# Patient Record
Sex: FEMALE | Race: WHITE | Employment: FULL TIME | URBAN - METROPOLITAN AREA
[De-identification: names, ages, dates, MRNs, and addresses within clinical notes are randomized per-mention and may not be internally consistent; named-entity substitution may affect disease eponyms.]

---

## 2018-05-08 ENCOUNTER — HOSPITAL ENCOUNTER (EMERGENCY)
Age: 28
Discharge: HOME | End: 2018-05-08
Attending: EMERGENCY MEDICINE | Admitting: EMERGENCY MEDICINE
Payer: SELF-PAY

## 2018-05-08 ENCOUNTER — APPOINTMENT (OUTPATIENT)
Dept: ULTRASOUND IMAGING | Age: 28
End: 2018-05-08
Attending: EMERGENCY MEDICINE
Payer: SELF-PAY

## 2018-05-08 VITALS
OXYGEN SATURATION: 98 % | HEART RATE: 82 BPM | TEMPERATURE: 98.8 F | DIASTOLIC BLOOD PRESSURE: 59 MMHG | BODY MASS INDEX: 20.88 KG/M2 | SYSTOLIC BLOOD PRESSURE: 94 MMHG | WEIGHT: 133 LBS | HEIGHT: 67 IN | RESPIRATION RATE: 16 BRPM

## 2018-05-08 DIAGNOSIS — O03.2 INCOMPLETE MISCARRIAGE WITH BLOOD CLOT: Primary | ICD-10-CM

## 2018-05-08 LAB
ABO + RH BLD: NORMAL
ALBUMIN SERPL-MCNC: 3.6 G/DL (ref 3.4–5)
ALBUMIN/GLOB SERPL: 1.2 {RATIO} (ref 0.8–1.7)
ALP SERPL-CCNC: 49 U/L (ref 45–117)
ALT SERPL-CCNC: 11 U/L (ref 13–56)
ANION GAP SERPL CALC-SCNC: 8 MMOL/L (ref 3–18)
AST SERPL-CCNC: 7 U/L (ref 15–37)
BASOPHILS # BLD: 0 K/UL (ref 0–0.06)
BASOPHILS NFR BLD: 0 % (ref 0–2)
BILIRUB SERPL-MCNC: 0.2 MG/DL (ref 0.2–1)
BUN SERPL-MCNC: 6 MG/DL (ref 7–18)
BUN/CREAT SERPL: 7 (ref 12–20)
CALCIUM SERPL-MCNC: 8.2 MG/DL (ref 8.5–10.1)
CHLORIDE SERPL-SCNC: 108 MMOL/L (ref 100–108)
CO2 SERPL-SCNC: 26 MMOL/L (ref 21–32)
CREAT SERPL-MCNC: 0.84 MG/DL (ref 0.6–1.3)
DIFFERENTIAL METHOD BLD: ABNORMAL
EOSINOPHIL # BLD: 0.2 K/UL (ref 0–0.4)
EOSINOPHIL NFR BLD: 2 % (ref 0–5)
ERYTHROCYTE [DISTWIDTH] IN BLOOD BY AUTOMATED COUNT: 12.7 % (ref 11.6–14.5)
GLOBULIN SER CALC-MCNC: 2.9 G/DL (ref 2–4)
GLUCOSE SERPL-MCNC: 118 MG/DL (ref 74–99)
HCG SERPL-ACNC: 913 MIU/ML (ref 0–10)
HCT VFR BLD AUTO: 37.3 % (ref 35–45)
HGB BLD-MCNC: 13.1 G/DL (ref 12–16)
LYMPHOCYTES # BLD: 2.4 K/UL (ref 0.9–3.6)
LYMPHOCYTES NFR BLD: 23 % (ref 21–52)
MCH RBC QN AUTO: 32 PG (ref 24–34)
MCHC RBC AUTO-ENTMCNC: 35.1 G/DL (ref 31–37)
MCV RBC AUTO: 91.2 FL (ref 74–97)
MONOCYTES # BLD: 0.5 K/UL (ref 0.05–1.2)
MONOCYTES NFR BLD: 5 % (ref 3–10)
NEUTS SEG # BLD: 7.2 K/UL (ref 1.8–8)
NEUTS SEG NFR BLD: 70 % (ref 40–73)
PLATELET # BLD AUTO: 207 K/UL (ref 135–420)
PMV BLD AUTO: 10.4 FL (ref 9.2–11.8)
POTASSIUM SERPL-SCNC: 3.9 MMOL/L (ref 3.5–5.5)
PROT SERPL-MCNC: 6.5 G/DL (ref 6.4–8.2)
RBC # BLD AUTO: 4.09 M/UL (ref 4.2–5.3)
SODIUM SERPL-SCNC: 142 MMOL/L (ref 136–145)
WBC # BLD AUTO: 10.4 K/UL (ref 4.6–13.2)

## 2018-05-08 PROCEDURE — 84702 CHORIONIC GONADOTROPIN TEST: CPT | Performed by: EMERGENCY MEDICINE

## 2018-05-08 PROCEDURE — 76817 TRANSVAGINAL US OBSTETRIC: CPT

## 2018-05-08 PROCEDURE — 96375 TX/PRO/DX INJ NEW DRUG ADDON: CPT

## 2018-05-08 PROCEDURE — 86900 BLOOD TYPING SEROLOGIC ABO: CPT | Performed by: EMERGENCY MEDICINE

## 2018-05-08 PROCEDURE — 85025 COMPLETE CBC W/AUTO DIFF WBC: CPT | Performed by: EMERGENCY MEDICINE

## 2018-05-08 PROCEDURE — 99284 EMERGENCY DEPT VISIT MOD MDM: CPT

## 2018-05-08 PROCEDURE — 96374 THER/PROPH/DIAG INJ IV PUSH: CPT

## 2018-05-08 PROCEDURE — 74011250636 HC RX REV CODE- 250/636: Performed by: EMERGENCY MEDICINE

## 2018-05-08 PROCEDURE — 96361 HYDRATE IV INFUSION ADD-ON: CPT

## 2018-05-08 PROCEDURE — 88304 TISSUE EXAM BY PATHOLOGIST: CPT | Performed by: EMERGENCY MEDICINE

## 2018-05-08 PROCEDURE — 80053 COMPREHEN METABOLIC PANEL: CPT | Performed by: EMERGENCY MEDICINE

## 2018-05-08 PROCEDURE — 88305 TISSUE EXAM BY PATHOLOGIST: CPT | Performed by: EMERGENCY MEDICINE

## 2018-05-08 RX ORDER — ONDANSETRON 2 MG/ML
4 INJECTION INTRAMUSCULAR; INTRAVENOUS
Status: COMPLETED | OUTPATIENT
Start: 2018-05-08 | End: 2018-05-08

## 2018-05-08 RX ORDER — MORPHINE SULFATE 10 MG/ML
4 INJECTION, SOLUTION INTRAMUSCULAR; INTRAVENOUS
Status: COMPLETED | OUTPATIENT
Start: 2018-05-08 | End: 2018-05-08

## 2018-05-08 RX ADMIN — ONDANSETRON 4 MG: 2 INJECTION INTRAMUSCULAR; INTRAVENOUS at 05:09

## 2018-05-08 RX ADMIN — SODIUM CHLORIDE 1000 ML: 900 INJECTION, SOLUTION INTRAVENOUS at 03:57

## 2018-05-08 RX ADMIN — MORPHINE SULFATE 4 MG: 10 INJECTION INTRAMUSCULAR; INTRAVENOUS; SUBCUTANEOUS at 05:10

## 2018-05-08 NOTE — ED NOTES
Pt ambulated to restroom and notified RN pt passed 'something' vaginally. MD Chrystal Pichardo assessed specimen at the bedside. Specimen sent to the lab for pathology.

## 2018-05-08 NOTE — ED NOTES
Pt upset over baby crying in another room. On return from ultrasound , moved to waiting room for discharge instructions. I have reviewed discharge instructions with the patient. The patient verbalized understanding. Patient armband removed and given to patient to take home. Patient was informed of the privacy risks if armband lost or stolen    The patient Eduardo Bellamy is a 33 y.o. female who  has no past medical history on file. .  She   The patient's medications were reviewed and discussed prior to discharge. The patient was very interactive and did understand their medications. The following medications were discussed in details with the patient. The patient said [facility]. [email or web address]    Nathaniel Hutton RN    [employer]    Thank you for requesting access to [employer]. Please follow the instructions below to securely access and download your online medical record. [employer] allows you to send messages to your doctor, view your test results, renew your prescriptions, schedule appointments, and more. How Do I Sign Up? 1. In your internet browser, go to [email or web address]  2. Click on the First Time User? Click Here link in the Sign In box. You will be redirect to the New Member Sign Up page. 3. Enter your [employer] Access Code exactly as it appears below. You will not need to use this code after youve completed the sign-up process. If you do not sign up before the expiration date, you must request a new code. [employer] Access Code: [unfilled] (This is the date your [employer] access code will )    4. Enter the last four digits of your Social Security Number (xxxx) and Date of Birth (mm/dd/yyyy) as indicated and click Submit. You will be taken to the next sign-up page. 5. Create a [employer] ID. This will be your [employer] login ID and cannot be changed, so think of one that is secure and easy to remember. 6. Create a [employer] password.  You can change your password at any time. 7. Enter your Password Reset Question and Answer. This can be used at a later time if you forget your password. 8. Enter your e-mail address. You will receive e-mail notification when new information is available in [address] 9. Click Sign Up. You can now view and download portions of your medical record. 10. Click the Download Summary menu link to download a portable copy of your medical information. Additional Information    If you have questions, please visit the Frequently Asked Questions section of the [employer] website at [email or web address] Remember, [employer] is NOT to be used for urgent needs. For medical emergencies, dial 911.

## 2018-05-08 NOTE — ED PROVIDER NOTES
HPI Comments: Fransisco Garcia is a 33 y.o. Female, , lmp b 26 with c/o increased vaginal bleeding since yesterday. No prenatal care. Pos home preg test 3 weeks. Ago. Bleeding increased tonight which woke her up. Passing some clots. No poc seen. Cramping is intermittent. No nvd, fcs. No h/o rhogam with previous pregnancies; bleeding worse with standing, and is spontaneous. Nothing taken prior to arrival      The history is provided by the patient. History reviewed. No pertinent past medical history. Past Surgical History:   Procedure Laterality Date    HX APPENDECTOMY           History reviewed. No pertinent family history. Social History     Social History    Marital status:      Spouse name: N/A    Number of children: N/A    Years of education: N/A     Occupational History    Not on file. Social History Main Topics    Smoking status: Former Smoker     Quit date: 3/8/2018    Smokeless tobacco: Never Used    Alcohol use No    Drug use: No    Sexual activity: Not on file     Other Topics Concern    Not on file     Social History Narrative    No narrative on file         ALLERGIES: Amoxicillin    Review of Systems   Constitutional: Negative for fever. HENT: Negative for sore throat. Eyes: Negative for visual disturbance. Respiratory: Negative for cough and shortness of breath. Cardiovascular: Negative for chest pain. Gastrointestinal: Positive for abdominal pain. Endocrine: Negative for polyuria. Genitourinary: Positive for vaginal bleeding. Negative for difficulty urinating. Musculoskeletal: Negative for gait problem. Skin: Negative for rash. Allergic/Immunologic: Negative for immunocompromised state. Neurological: Positive for light-headedness. Hematological: Does not bruise/bleed easily. Psychiatric/Behavioral: Positive for sleep disturbance.        Vitals:    18 0415 18 0430 18 0445 18 0500   BP: 111/61 105/56 110/67 108/59 Pulse:    82   Resp:       Temp:       SpO2: 100% 100% 100% 100%   Weight:       Height:                Physical Exam   Constitutional: She is oriented to person, place, and time. She appears well-developed and well-nourished. Non-toxic appearance. She does not have a sickly appearance. She does not appear ill. She appears distressed. HENT:   Head: Normocephalic and atraumatic. Right Ear: External ear normal.   Left Ear: External ear normal.   Nose: Nose normal.   Mouth/Throat: Uvula is midline, oropharynx is clear and moist and mucous membranes are normal.   Eyes: Conjunctivae are normal. No scleral icterus. Neck: Neck supple. Cardiovascular: Normal rate, regular rhythm, normal heart sounds and intact distal pulses. Pulmonary/Chest: Effort normal and breath sounds normal.   Abdominal: Soft. There is no hepatosplenomegaly. There is tenderness. There is no rigidity, no rebound, no guarding and no CVA tenderness. Genitourinary:   Genitourinary Comments: Large amount of blood in vaginal vault. Os open with POC extruding   Musculoskeletal: She exhibits no edema. Neurological: She is alert and oriented to person, place, and time. Gait normal.   Skin: Skin is warm and dry. She is not diaphoretic. Psychiatric: Her behavior is normal.   Nursing note and vitals reviewed.        [facility]      ED Course       Procedures    Vitals:  Patient Vitals for the past 12 hrs:   Temp Pulse Resp BP SpO2   05/08/18 0500 - 82 - 108/59 100 %   05/08/18 0445 - - - 110/67 100 %   05/08/18 0430 - - - 105/56 100 %   05/08/18 0415 - - - 111/61 100 %   05/08/18 0345 - - - 115/67 99 %   05/08/18 0343 98.8 °F (37.1 °C) (!) 108 16 118/85 100 %         Medications ordered:   Medications   sodium chloride 0.9 % bolus infusion 1,000 mL (1,000 mL IntraVENous New Bag 5/8/18 2797)   morphine injection 4 mg (4 mg IntraVENous Given 5/8/18 9910)   ondansetron (ZOFRAN) injection 4 mg (4 mg IntraVENous Given 5/8/18 6051)         Lab findings:  Recent Results (from the past 12 hour(s))   CBC WITH AUTOMATED DIFF    Collection Time: 05/08/18  3:52 AM   Result Value Ref Range    WBC 10.4 4.6 - 13.2 K/uL    RBC 4.09 (L) 4.20 - 5.30 M/uL    HGB 13.1 12.0 - 16.0 g/dL    HCT 37.3 35.0 - 45.0 %    MCV 91.2 74.0 - 97.0 FL    MCH 32.0 24.0 - 34.0 PG    MCHC 35.1 31.0 - 37.0 g/dL    RDW 12.7 11.6 - 14.5 %    PLATELET 713 279 - 097 K/uL    MPV 10.4 9.2 - 11.8 FL    NEUTROPHILS 70 40 - 73 %    LYMPHOCYTES 23 21 - 52 %    MONOCYTES 5 3 - 10 %    EOSINOPHILS 2 0 - 5 %    BASOPHILS 0 0 - 2 %    ABS. NEUTROPHILS 7.2 1.8 - 8.0 K/UL    ABS. LYMPHOCYTES 2.4 0.9 - 3.6 K/UL    ABS. MONOCYTES 0.5 0.05 - 1.2 K/UL    ABS. EOSINOPHILS 0.2 0.0 - 0.4 K/UL    ABS. BASOPHILS 0.0 0.0 - 0.06 K/UL    DF AUTOMATED     BLOOD TYPE, (ABO+RH)    Collection Time: 05/08/18  3:52 AM   Result Value Ref Range    ABO/Rh(D) B POSITIVE    METABOLIC PANEL, COMPREHENSIVE    Collection Time: 05/08/18  3:52 AM   Result Value Ref Range    Sodium 142 136 - 145 mmol/L    Potassium 3.9 3.5 - 5.5 mmol/L    Chloride 108 100 - 108 mmol/L    CO2 26 21 - 32 mmol/L    Anion gap 8 3.0 - 18 mmol/L    Glucose 118 (H) 74 - 99 mg/dL    BUN 6 (L) 7.0 - 18 MG/DL    Creatinine 0.84 0.6 - 1.3 MG/DL    BUN/Creatinine ratio 7 (L) 12 - 20      GFR est AA >60 >60 ml/min/1.73m2    GFR est non-AA >60 >60 ml/min/1.73m2    Calcium 8.2 (L) 8.5 - 10.1 MG/DL    Bilirubin, total 0.2 0.2 - 1.0 MG/DL    ALT (SGPT) 11 (L) 13 - 56 U/L    AST (SGOT) 7 (L) 15 - 37 U/L    Alk.  phosphatase 49 45 - 117 U/L    Protein, total 6.5 6.4 - 8.2 g/dL    Albumin 3.6 3.4 - 5.0 g/dL    Globulin 2.9 2.0 - 4.0 g/dL    A-G Ratio 1.2 0.8 - 1.7     BETA HCG, QT    Collection Time: 05/08/18  3:52 AM   Result Value Ref Range    Beta HCG,  (H) 0 - 10 MIU/ML       EKG interpretation by ED Physician:      X-Ray, CT or other radiology findings or impressions:  US UTS TRANSVAGINAL OB    (Results Pending)       Progress notes, Consult notes or additional Procedure notes:   Miscarriage in progress. Will keep pt here for formal us in am.   D/w pt current plan  Will t/o to dr Anna Hutton to f/u on US results, possible ob consult    Pt did pass specimen that appears to be c/w poc which was sent for path evaluation    Reevaluation of patient:   stable    Disposition:  Diagnosis:   1.  Incomplete miscarriage with blood clot        Disposition: pending completion work up    Follow-up Information     Follow up With Details Comments [address], [place] Schedule an appointment as soon as possible for a visit  Carolee Mello [address] [facility] EMERGENCY DEPT  If symptoms worsen [address] [place]  [phone]            Patient's Medications    No medications on file

## 2018-05-08 NOTE — DISCHARGE INSTRUCTIONS
Miscarriage: Care Instructions  Your Care Instructions    The loss of a pregnancy can be very hard. You may wonder why it happened or blame yourself. Miscarriages are common and are not caused by exercise, stress, or sex. Most happen because the fertilized egg in the uterus does not develop normally. There is no treatment that can stop a miscarriage. As long as you do not have heavy blood loss, fever, weakness, or other signs of infection, you can let a miscarriage follow its own course. This can take several days. Your body will recover over the next several weeks. Having a miscarriage does not mean you cannot have a normal pregnancy in the future. The doctor has checked you carefully, but problems can develop later. If you notice any problems or new symptoms, get medical treatment right away. Follow-up care is a key part of your treatment and safety. Be sure to make and go to all appointments, and call your doctor if you are having problems. It's also a good idea to know your test results and keep a list of the medicines you take. How can you care for yourself at home? · You will probably have some vaginal bleeding for 1 to 2 weeks. It may be similar to or slightly heavier than a normal period. The bleeding should get lighter after a week. Use pads instead of tampons. You may use tampons during your next period, which should start in 3 to 6 weeks. · Take an over-the-counter pain medicine, such as acetaminophen (Tylenol), ibuprofen (Advil, Motrin), or naproxen (Aleve) for cramps. Read and follow all instructions on the label. You may have cramps for several days after the miscarriage. · Do not take two or more pain medicines at the same time unless the doctor told you to. Many pain medicines have acetaminophen, which is Tylenol. Too much acetaminophen (Tylenol) can be harmful. · Use a clear container to save any tissue that you pass. Take it to your doctor's office as soon as you can.   · Do not have sex until the bleeding stops. · You may return to your normal activities if you feel well enough to do so. But you should avoid heavy exercise until the bleeding stops. · If you plan to get pregnant again, check with your doctor. Most doctors suggest waiting until you have had at least one normal period before you try to get pregnant. · If you do not want to get pregnant, ask your doctor about birth control. You can get pregnant again before your next period starts if you are not using birth control. · You may be low in iron because of blood loss. Eat a balanced diet that is high in iron and vitamin C. Foods rich in iron include red meat, shellfish, eggs, beans, and leafy green vegetables. Foods high in vitamin C include citrus fruits, tomatoes, and broccoli. Talk to your doctor about whether you need to take iron pills or a multivitamin. · The loss of a pregnancy can be very hard. You may wonder why it happened and blame yourself. Talking to family members, friends, a counselor, or your doctor may help you cope with your loss. When should you call for help? Call 911 anytime you think you may need emergency care. For example, call if:  ? · You passed out (lost consciousness). ?Call your doctor now or seek immediate medical care if:  ? · You have severe vaginal bleeding. ? · You are dizzy or lightheaded, or you feel like you may faint. ? · You have new or worse pain in your belly or pelvis. ? · You have a fever. ? · You have vaginal discharge that smells bad. ? Watch closely for changes in your health, and be sure to contact your doctor if:  ? · You do not get better as expected. Where can you learn more? Go to http://lilliam-demar.info/. Enter E802 in the search box to learn more about \"Miscarriage: Care Instructions. \"  Current as of: March 16, 2017  Content Version: 11.4  © 8580-1376 Healthwise, [employer].  Care instructions adapted under license by [employer] (which disclaims liability or warranty for this information). If you have questions about a medical condition or this instruction, always ask your healthcare professional. [address] any warranty or liability for your use of this information.

## 2018-05-08 NOTE — ED NOTES
Verbal shift change report given to Kate Powers RN (oncoming nurse) by Mike Lima RN (offgoing nurse). Report included the following information SBAR.

## 2018-05-08 NOTE — ED NOTES
Introduced self to patient . Patient aware of call bell. Given expectations of treatment ordered. Pt awaiting ultrasound.  Remains on monitor with BP cycling and pulse ox

## 2018-05-08 NOTE — ED TRIAGE NOTES
Pt arrived to the ED through triage. Pt stated pt is about 11 weeks pregnant and noticed brown color vaginal discharge and bleeding. Pt soaked two pads pta. Pt c/o lower abdominal and back pain.

## 2018-05-08 NOTE — ED NOTES
6:00 AM :Pt care assumed from Dr. Bernett Cranker, ED provider. Pt complaint(s), current treatment plan, progression and available diagnostic results have been discussed thoroughly. Rounding occurred: yes  Intended Disposition: Home, if US negative. Pending diagnostic reports and/or labs (please list): US Transvaginal.    8:07 AM: Evaluated patient. States she has no symptoms currently. She knows if there is something abnormal found in the US, she will have to come back to the ED. Patient states she is able to follow up with OB as she is a birthing . Patient verbalizes she will wait for my results, patient is currently asymptomatic.    12:09 PM: Patient called and discussed her US. No IUP, ectopic cannot be excluded. Patient states she will follow up and if anything changes, will return to the ED. Scribe Attestation     Jude acting as a scribe for and in the presence of Ninfa Elizabeth MD      May 08, 2018 at 6:01 AM       Provider Attestation:      I personally performed the services described in the documentation, reviewed the documentation, as recorded by the scribe in my presence, and it accurately and completely records my words and actions.  May 08, 2018 at [address] Ninfa Elizabeth MD

## 2018-05-09 NOTE — ED NOTES
Received call from pathologist this morning. Specimen sent to path thought to be products of conception. Per pathologist, no products of conception in specimen. It is only clot. Called patient's number on file to review these findings. No answer, voicemail left with call back number. Attempted to reach Emergency Contact (pt's ) with no answer.     Sarina Easley MD  5/9/2018

## 2018-05-14 ENCOUNTER — HOSPITAL ENCOUNTER (EMERGENCY)
Age: 28
Discharge: HOME | End: 2018-05-14
Attending: EMERGENCY MEDICINE
Payer: SELF-PAY

## 2018-05-14 ENCOUNTER — APPOINTMENT (OUTPATIENT)
Dept: ULTRASOUND IMAGING | Age: 28
End: 2018-05-14
Attending: PHYSICIAN ASSISTANT
Payer: SELF-PAY

## 2018-05-14 VITALS
BODY MASS INDEX: 20.4 KG/M2 | DIASTOLIC BLOOD PRESSURE: 71 MMHG | TEMPERATURE: 98.9 F | OXYGEN SATURATION: 99 % | WEIGHT: 130 LBS | RESPIRATION RATE: 16 BRPM | SYSTOLIC BLOOD PRESSURE: 114 MMHG | HEIGHT: 67 IN | HEART RATE: 83 BPM

## 2018-05-14 DIAGNOSIS — N30.00 ACUTE CYSTITIS WITHOUT HEMATURIA: ICD-10-CM

## 2018-05-14 DIAGNOSIS — O03.4 INCOMPLETE ABORTION: Primary | ICD-10-CM

## 2018-05-14 LAB
APPEARANCE UR: CLEAR
BACTERIA URNS QL MICRO: ABNORMAL /HPF
BILIRUB UR QL: NEGATIVE
COLOR UR: YELLOW
EPITH CASTS URNS QL MICRO: ABNORMAL /LPF (ref 0–5)
GLUCOSE UR STRIP.AUTO-MCNC: NEGATIVE MG/DL
HCG SERPL-ACNC: 42 MIU/ML (ref 0–10)
HGB UR QL STRIP: ABNORMAL
KETONES UR QL STRIP.AUTO: NEGATIVE MG/DL
LEUKOCYTE ESTERASE UR QL STRIP.AUTO: ABNORMAL
NITRITE UR QL STRIP.AUTO: NEGATIVE
PH UR STRIP: 7.5 [PH] (ref 5–8)
PROT UR STRIP-MCNC: NEGATIVE MG/DL
RBC #/AREA URNS HPF: ABNORMAL /HPF (ref 0–5)
SERVICE CMNT-IMP: NORMAL
SP GR UR REFRACTOMETRY: 1.01 (ref 1–1.03)
UROBILINOGEN UR QL STRIP.AUTO: 0.2 EU/DL (ref 0.2–1)
WBC URNS QL MICRO: ABNORMAL /HPF (ref 0–4)
WET PREP GENITAL: NORMAL

## 2018-05-14 PROCEDURE — 96374 THER/PROPH/DIAG INJ IV PUSH: CPT

## 2018-05-14 PROCEDURE — 84702 CHORIONIC GONADOTROPIN TEST: CPT | Performed by: PHYSICIAN ASSISTANT

## 2018-05-14 PROCEDURE — 87210 SMEAR WET MOUNT SALINE/INK: CPT | Performed by: PHYSICIAN ASSISTANT

## 2018-05-14 PROCEDURE — 88305 TISSUE EXAM BY PATHOLOGIST: CPT

## 2018-05-14 PROCEDURE — 81001 URINALYSIS AUTO W/SCOPE: CPT | Performed by: PHYSICIAN ASSISTANT

## 2018-05-14 PROCEDURE — 99284 EMERGENCY DEPT VISIT MOD MDM: CPT

## 2018-05-14 PROCEDURE — 87491 CHLMYD TRACH DNA AMP PROBE: CPT | Performed by: PHYSICIAN ASSISTANT

## 2018-05-14 PROCEDURE — 74011250636 HC RX REV CODE- 250/636: Performed by: PHYSICIAN ASSISTANT

## 2018-05-14 PROCEDURE — 76817 TRANSVAGINAL US OBSTETRIC: CPT

## 2018-05-14 PROCEDURE — 74011250637 HC RX REV CODE- 250/637: Performed by: PHYSICIAN ASSISTANT

## 2018-05-14 RX ORDER — MISOPROSTOL 200 UG/1
600 TABLET ORAL ONCE
Qty: 3 TAB | Refills: 0 | Status: SHIPPED | OUTPATIENT
Start: 2018-05-14 | End: 2018-05-14

## 2018-05-14 RX ORDER — HYDROCODONE BITARTRATE AND ACETAMINOPHEN 5; 325 MG/1; MG/1
1 TABLET ORAL
Status: COMPLETED | OUTPATIENT
Start: 2018-05-14 | End: 2018-05-14

## 2018-05-14 RX ORDER — NITROFURANTOIN 25; 75 MG/1; MG/1
100 CAPSULE ORAL 2 TIMES DAILY
Qty: 6 CAP | Refills: 0 | Status: SHIPPED | OUTPATIENT
Start: 2018-05-14 | End: 2018-05-17

## 2018-05-14 RX ORDER — HYDROCODONE BITARTRATE AND ACETAMINOPHEN 5; 325 MG/1; MG/1
1 TABLET ORAL
Qty: 10 TAB | Refills: 0 | Status: SHIPPED | OUTPATIENT
Start: 2018-05-14

## 2018-05-14 RX ORDER — MORPHINE SULFATE 10 MG/ML
2 INJECTION, SOLUTION INTRAMUSCULAR; INTRAVENOUS
Status: COMPLETED | OUTPATIENT
Start: 2018-05-14 | End: 2018-05-14

## 2018-05-14 RX ADMIN — MORPHINE SULFATE 2 MG: 10 INJECTION INTRAMUSCULAR; INTRAVENOUS; SUBCUTANEOUS at 14:45

## 2018-05-14 RX ADMIN — HYDROCODONE BITARTRATE AND ACETAMINOPHEN 1 TABLET: 5; 325 TABLET ORAL at 12:45

## 2018-05-14 NOTE — DISCHARGE INSTRUCTIONS
Miscarriage: Care Instructions  Your Care Instructions    The loss of a pregnancy can be very hard. You may wonder why it happened or blame yourself. Miscarriages are common and are not caused by exercise, stress, or sex. Most happen because the fertilized egg in the uterus does not develop normally. There is no treatment that can stop a miscarriage. As long as you do not have heavy blood loss, fever, weakness, or other signs of infection, you can let a miscarriage follow its own course. This can take several days. Your body will recover over the next several weeks. Having a miscarriage does not mean you cannot have a normal pregnancy in the future. The doctor has checked you carefully, but problems can develop later. If you notice any problems or new symptoms, get medical treatment right away. Follow-up care is a key part of your treatment and safety. Be sure to make and go to all appointments, and call your doctor if you are having problems. It's also a good idea to know your test results and keep a list of the medicines you take. How can you care for yourself at home? · You will probably have some vaginal bleeding for 1 to 2 weeks. It may be similar to or slightly heavier than a normal period. The bleeding should get lighter after a week. Use pads instead of tampons. You may use tampons during your next period, which should start in 3 to 6 weeks. · Take an over-the-counter pain medicine, such as acetaminophen (Tylenol), ibuprofen (Advil, Motrin), or naproxen (Aleve) for cramps. Read and follow all instructions on the label. You may have cramps for several days after the miscarriage. · Do not take two or more pain medicines at the same time unless the doctor told you to. Many pain medicines have acetaminophen, which is Tylenol. Too much acetaminophen (Tylenol) can be harmful. · Use a clear container to save any tissue that you pass. Take it to your doctor's office as soon as you can.   · Do not have sex until the bleeding stops. · You may return to your normal activities if you feel well enough to do so. But you should avoid heavy exercise until the bleeding stops. · If you plan to get pregnant again, check with your doctor. Most doctors suggest waiting until you have had at least one normal period before you try to get pregnant. · If you do not want to get pregnant, ask your doctor about birth control. You can get pregnant again before your next period starts if you are not using birth control. · You may be low in iron because of blood loss. Eat a balanced diet that is high in iron and vitamin C. Foods rich in iron include red meat, shellfish, eggs, beans, and leafy green vegetables. Foods high in vitamin C include citrus fruits, tomatoes, and broccoli. Talk to your doctor about whether you need to take iron pills or a multivitamin. · The loss of a pregnancy can be very hard. You may wonder why it happened and blame yourself. Talking to family members, friends, a counselor, or your doctor may help you cope with your loss. When should you call for help? Call 911 anytime you think you may need emergency care. For example, call if:  ? · You passed out (lost consciousness). ?Call your doctor now or seek immediate medical care if:  ? · You have severe vaginal bleeding. ? · You are dizzy or lightheaded, or you feel like you may faint. ? · You have new or worse pain in your belly or pelvis. ? · You have a fever. ? · You have vaginal discharge that smells bad. ? Watch closely for changes in your health, and be sure to contact your doctor if:  ? · You do not get better as expected. Where can you learn more? Go to http://lilliam-demar.info/. Enter E802 in the search box to learn more about \"Miscarriage: Care Instructions. \"  Current as of: March 16, 2017  Content Version: 11.4  © 2633-3697 Healthwise, [employer].  Care instructions adapted under license by [employer] (which disclaims liability or warranty for this information). If you have questions about a medical condition or this instruction, always ask your healthcare professional. [address] any warranty or liability for your use of this information. Urinary Tract Infection in Pregnancy: Care Instructions  Your Care Instructions    A urinary tract infection, or UTI, is an infection of the bladder and other urinary structures. Most UTIs occur in the bladder. They often cause pain or burning when you urinate. UTI is the most common bacterial infection in pregnancy. If untreated, a UTI could lead to problems such as a kidney infection or  labor. Most UTIs can be cured with antibiotics. Your doctor will prescribe an antibiotic that is safe during pregnancy. Be sure to finish your medicine so that the infection does not spread to your kidneys. Follow-up care is a key part of your treatment and safety. Be sure to make and go to all appointments, and call your doctor if you are having problems. It's also a good idea to know your test results and keep a list of the medicines you take. How can you care for yourself at home? · Take your antibiotics as directed. Do not stop taking them just because you feel better. You need to take the full course of antibiotics. · Drink extra water and other fluids for the next day or two. This will help wash out the bacteria causing the infection. If you have kidney, heart, or liver disease and have to limit fluids, talk with your doctor before you increase the amount of fluids you drink. · Do not drink alcohol. · Urinate often. Try to empty your bladder each time. Preventing UTIs  · Drink plenty of fluids, enough so that your urine is light yellow or clear like water. This helps you urinate often, which clears bacteria from your system.  If you have kidney, heart, or liver disease and have to limit fluids, talk with your doctor before you increase the amount of fluids you drink. · Urinate when you first have the urge. · Urinate right after you have sex. This is the best way for women to avoid UTIs. · When going to the bathroom, wipe from front to back to keep bacteria from entering the vagina or urethra. When should you call for help? Call your doctor now or seek immediate medical care if:  ? · You have symptoms of a worse urinary tract infection. These may include:  ¨ Pain or burning when you urinate. ¨ A frequent need to urinate without being able to pass much urine. ¨ Pain in the flank, which is just below the rib cage and above the waist on either side of the back. ¨ Blood in your urine. ¨ A fever. ? Watch closely for changes in your health, and be sure to contact your doctor if:  ? · You do not get better as expected. Where can you learn more? Go to http://lilliam-demar.info/. Enter M982 in the search box to learn more about \"Urinary Tract Infection in Pregnancy: Care Instructions. \"  Current as of: March 16, 2017  Content Version: 11.4  © 5305-0427 [employer]. Care instructions adapted under license by [employer] (which disclaims liability or warranty for this information). If you have questions about a medical condition or this instruction, always ask your healthcare professional. [place] [address] any warranty or liability for your use of this information.

## 2018-05-14 NOTE — ED TRIAGE NOTES
Pt c/o vaginal odor, discharge, and lower abdominal pain. \"i went home to finish the miscarriage. \"

## 2018-05-14 NOTE — LETTER
NOTIFICATION RETURN TO WORK / SCHOOL 
 
5/14/2018 4:01 PM 
 
Ms. Lucía Gongora [address] [facility] [address] [id number] To Whom It May Concern: 
 
Lucía Gongora is currently under the care of [facility] EMERGENCY DEPT. She will return to work/school on: 5/16/18 If there are questions or concerns please have the patient contact our office.  
 
 
 
Sincerely, 
 
 
DIONNE Bruce

## 2018-05-14 NOTE — ED PROVIDER NOTES
HPI Comments: Endy Quevedo is a 33 y.o.  female with hx of incomplete  and appendectomy that presents to the ED with continued pelvic pain, vaginal discharge and odor. Pt states she was seen in the ED on  for similar sx and diagnosed with incomplete  asn decided to go home topass products of conception. Pt state she was given warning signs for return to ED and although she does not have fever, she is having increased pain, and discharge. SHe rates her pain as a 6/10 and described it as cramping. SHe denies fever, HA, NVD, CP and SOB    Patient is a 33 y.o. female presenting with vaginal bleeding and miscarriage. The history is provided by the patient. Vaginal Bleeding   This is a recurrent problem. The current episode started more than 2 days ago. The problem occurs daily. The problem has been gradually improving. Pertinent negatives include no chest pain, no abdominal pain, no headaches and no shortness of breath. She has tried nothing for the symptoms. Miscarriage    This is a recurrent problem. The current episode started more than 2 days ago. The problem occurs constantly. The pain is located in the suprapubic region. The quality of the pain is cramping. The pain is at a severity of 6/10. Pertinent negatives include no fever, no diarrhea, no nausea, no vomiting, no dysuria, no headaches, no chest pain and no back pain. Nothing worsens the pain. The pain is relieved by nothing. Past workup includes ultrasound. History reviewed. No pertinent past medical history. Past Surgical History:   Procedure Laterality Date    HX APPENDECTOMY           History reviewed. No pertinent family history. Social History     Social History    Marital status:      Spouse name: N/A    Number of children: N/A    Years of education: N/A     Occupational History    Not on file.      Social History Main Topics    Smoking status: Former Smoker     Quit date: 3/8/2018    Smokeless tobacco: Never Used    Alcohol use No    Drug use: No    Sexual activity: Not on file     Other Topics Concern    Not on file     Social History Narrative         ALLERGIES: Amoxicillin    Review of Systems   Constitutional: Negative for fatigue and fever. HENT: Negative for congestion. Eyes: Negative for pain. Respiratory: Negative for cough and shortness of breath. Cardiovascular: Negative for chest pain. Gastrointestinal: Negative for abdominal pain, diarrhea, nausea and vomiting. Genitourinary: Positive for vaginal bleeding and vaginal discharge. Negative for dysuria. Musculoskeletal: Negative for back pain. Skin: Negative for wound. Neurological: Negative for dizziness and headaches. All other systems reviewed and are negative. Vitals:    05/14/18 1114   BP: 130/78   Pulse: 99   Resp: 16   Temp: 98.9 °F (37.2 °C)   SpO2: 100%   Weight: 59 kg (130 lb)   Height: 5' 7\" (1.702 m)            Physical Exam   Constitutional: She is oriented to person, place, and time. She appears well-developed and well-nourished. No distress. HENT:   Head: Normocephalic and atraumatic. Nose: Nose normal.   Eyes: Conjunctivae are normal. Pupils are equal, round, and reactive to light. Neck: Normal range of motion. Cardiovascular: Normal rate, regular rhythm and normal heart sounds. Pulmonary/Chest: Effort normal and breath sounds normal. No respiratory distress. She has no wheezes. Abdominal: Soft. Normal appearance and bowel sounds are normal. She exhibits no distension. There is tenderness in the suprapubic area. There is no rigidity, no rebound, no guarding, no CVA tenderness, no tenderness at McBurney's point and negative Hurt's sign. Musculoskeletal: Normal range of motion. Neurological: She is alert and oriented to person, place, and time. Skin: Skin is warm and dry. Psychiatric: She has a normal mood and affect.  Her behavior is normal.        MDM  Number of Diagnoses or Management Options  Acute cystitis without hematuria:   Incomplete :   Diagnosis management comments: Labs Reviewed  BETA HCG, QT - Abnormal; Notable for the following:      Beta HCG, QT                  42 (*)              All other components within normal limits  URINALYSIS W/ RFLX MICROSCOPIC - Abnormal; Notable for the following:      Blood                         MODERATE (*)               Leukocyte Esterase            MODERATE (*)            All other components within normal limits  URINE MICROSCOPIC ONLY - Abnormal; Notable for the following:      Bacteria                      1+ (*)              All other components within normal limits  WET PREP  CHLAMYDIA/NEISSERIA AMPLIFICATION  PATHOLOGY SPECIMEN TO LAB      US Results (most recent):  Results from Hospital Encounter encounter on 18    US UTS TRANSVAGINAL OB    Narrative  Ultrasound Pelvis: Transvaginal    INDICATION: Pelvic pain, vaginal bleeding. COMPARISON: None. TECHNIQUE: Real-time transvaginal  sonography in multiple planes was performed  with image documentation. Grayscale, color flow Doppler imaging, and velocity  spectral waveform analysis of the ovaries  was performed (duplex imaging). FINDINGS:    UTERUS: Normal in size and echotexture measuring 7.9 x 3.9 x 6.4 cm. 7 mm  hypoechoic lesion in the uterine body likely a small fibroid. ENDOMETRIUM: Thickened up to 13 mm with vascular flow internally. No intrauterine gestation identified. RIGHT OVARY and ADNEXA: Right ovary is normal in size and echotexture measuring   3 x 3.1 x 2.2 cm. No ovarian or adnexal masses identified. Color and spectral  Doppler demonstrate normal flow to the right ovary with no evidence of ovarian  torsion. The systolic and venous waveforms are within normal limits. LEFT OVARY and ADNEXA: Left ovary is normal in size and echotexture measuring 3  x 2.8 x 1.1 cm. No ovarian or adnexal masses identified.  Color and spectral  Doppler demonstrate normal flow to the left ovary with no evidence of ovarian  torsion. The systolic and venous waveforms are within normal limits. .    OTHER: Trace free fluid identified. Impression  IMPRESSION: Persistent thickened heterogeneous endometrial tissues with vascular  flow internally. Findings suspicious for retained products of conception. .    Previous AB RHO reviewed   A positive    Consult:    4:02 PM   Discussed care with Dr Shelbi Abbott OBGYN. Standard discussion; including history of patient's chief complaint, available diagnostic results, and treatment course. PLAN: As long as pt is non- toxic, no septic discharge home for office follow up. Treat UTI, prescribe Cytotec and pain control. Impression: incomplete , UTI       Amount and/or Complexity of Data Reviewed  Clinical lab tests: ordered and reviewed  Tests in the radiology section of CPT®: ordered and reviewed  Review and summarize past medical records: yes    Risk of Complications, Morbidity, and/or Mortality  Presenting problems: moderate  Diagnostic procedures: moderate  Management options: moderate    Patient Progress  Patient progress: stable        ED Course       Pelvic Exam  Date/Time: 2018 4:06 PM  Performed by: PA  Procedure duration:  5 minutes. Exam assisted by:  ED RN John Paul Calvillo. Type of exam performed: bimanual and speculum. External genitalia appearance: normal.    Vaginal exam:  odor. Cervical exam:  evidence of products of conception and os closed. Specimen(s) collected:  GC, chlamydia and vaginal culture. Bimanual exam:  left adenexal tenderness and right adenexal tenderness.     Patient tolerance: Patient tolerated the procedure well with no immediate complications  Comments: POC sent to lab                 Vitals:  Patient Vitals for the past 12 hrs:   Temp Pulse Resp BP SpO2   18 1538 - 83 16 114/71 99 %   18 1114 98.9 °F (37.2 °C) 99 16 130/78 100 %         Medications ordered: Medications   HYDROcodone-acetaminophen (NORCO) 5-325 mg per tablet 1 Tab (1 Tab Oral Given 18 1245)   morphine injection 2 mg (2 mg IntraVENous Given 18 1445)         Lab findings:  Recent Results (from the past 12 hour(s))   URINALYSIS W/ RFLX MICROSCOPIC    Collection Time: 18 11:40 AM   Result Value Ref Range    Color YELLOW      Appearance CLEAR      Specific gravity 1.008 1.005 - 1.030      pH (UA) 7.5 5.0 - 8.0      Protein NEGATIVE  NEG mg/dL    Glucose NEGATIVE  NEG mg/dL    Ketone NEGATIVE  NEG mg/dL    Bilirubin NEGATIVE  NEG      Blood MODERATE (A) NEG      Urobilinogen 0.2 0.2 - 1.0 EU/dL    Nitrites NEGATIVE  NEG      Leukocyte Esterase MODERATE (A) NEG     URINE MICROSCOPIC ONLY    Collection Time: 18 11:40 AM   Result Value Ref Range    WBC 4 to 10 0 - 4 /hpf    RBC 4 to 10 0 - 5 /hpf    Epithelial cells 2+ 0 - 5 /lpf    Bacteria 1+ (A) NEG /hpf   BETA HCG, QT    Collection Time: 18  1:19 PM   Result Value Ref Range    Beta HCG, QT 42 (H) 0 - 10 MIU/ML   WET PREP    Collection Time: 18  2:55 PM   Result Value Ref Range    Special Requests: NO SPECIAL REQUESTS      Wet prep NO TRICHOMONAS SEEN      Wet prep FEW  CLUE CELLS PRESENT        Wet prep NO FUNGAL ELEMENTS SEEN             X-Ray, CT or other radiology findings or impressions:  US UTS TRANSVAGINAL OB   Final Result          Progress notes, Consult notes or additional Procedure notes:       Disposition:  Diagnosis:   1. Incomplete     2.  Acute cystitis without hematuria        Disposition: discharge    Follow-up Information     Follow up With Details Comments Contact Info    Angelica Medina MD Call in 1 day follow up [facility] EMERGENCY DEPT  If symptoms worsen [address] [phone]           Patient's Medications   Start Taking    HYDROCODONE-ACETAMINOPHEN ([place]) 5-325 MG PER TABLET    Take 1 Tab by mouth every four (4) hours as needed for Pain. Max Daily Amount: 6 Tabs. MISOPROSTOL (CYTOTEC) 200 MCG TABLET    Take 3 Tabs by mouth once for 1 dose. NITROFURANTOIN, MACROCRYSTAL-MONOHYDRATE, (MACROBID) 100 MG CAPSULE    Take 1 Cap by mouth two (2) times a day for 3 days.    Continue Taking    No medications on file   These Medications have changed    No medications on file   Stop Taking    No medications on file

## 2018-05-14 NOTE — ED NOTES
I performed a brief evaluation, including history and physical, of the patient here in triage and I have determined that pt will need further treatment and evaluation from the fast track ER physician. I have placed initial orders to help in expediting patients care.      May 14, 2018 at 11:15 AM - Hugo Ledesma

## 2018-05-15 LAB
C TRACH RRNA SPEC QL NAA+PROBE: NEGATIVE
N GONORRHOEA RRNA SPEC QL NAA+PROBE: NEGATIVE
SPECIMEN SOURCE: NORMAL

## 2018-05-16 ENCOUNTER — OFFICE VISIT (OUTPATIENT)
Dept: OBGYN CLINIC | Age: 28
End: 2018-05-16

## 2024-01-18 NOTE — PROGRESS NOTES
A user error has taken place: encounter opened in error, closed for administrative reasons. verbal instruction/patient instructed